# Patient Record
Sex: FEMALE | Race: WHITE | Employment: FULL TIME | URBAN - METROPOLITAN AREA
[De-identification: names, ages, dates, MRNs, and addresses within clinical notes are randomized per-mention and may not be internally consistent; named-entity substitution may affect disease eponyms.]

---

## 2019-06-13 ENCOUNTER — HOSPITAL ENCOUNTER (OUTPATIENT)
Dept: PHYSICAL THERAPY | Age: 34
Setting detail: THERAPIES SERIES
Discharge: HOME | End: 2019-06-13
Payer: COMMERCIAL

## 2019-06-13 PROCEDURE — 97530 THERAPEUTIC ACTIVITIES: CPT

## 2019-06-13 PROCEDURE — 97162 PT EVAL MOD COMPLEX 30 MIN: CPT

## 2019-06-13 PROCEDURE — 97110 THERAPEUTIC EXERCISES: CPT

## 2019-06-13 NOTE — PROGRESS NOTES
Home Exercise Program, Manual Therapy - Soft Tissue Mobilization    G-Code   FAAM= 10/84-12%     Goals  Short term goals  Time Frame for Short term goals: 1-2 wks  Short term goal 1: I with HEP  Long term goals  Time Frame for Long term goals : 3-4 wks  Long term goal 1: Improve ankle strength 5/5  Long term goal 2: Improve FAAM 5% or less  Long term goal 3: Perform ADLs including walking for exercise and return to yoga and long distance walking without rolling or spraining ankle  Patient Goals   Patient goals : \"not to fall\" and strengthen ankle to prevent progression of arthritis       Therapy Time   Individual Concurrent Group Co-treatment   Time In  1600         Time Out  1705         Minutes  [facility], [address] [facility], Jignesh Urbano

## 2019-06-19 ENCOUNTER — HOSPITAL ENCOUNTER (OUTPATIENT)
Dept: PHYSICAL THERAPY | Age: 34
Setting detail: THERAPIES SERIES
Discharge: HOME | End: 2019-06-19
Payer: COMMERCIAL

## 2019-06-19 PROCEDURE — 97530 THERAPEUTIC ACTIVITIES: CPT

## 2019-06-19 PROCEDURE — 97110 THERAPEUTIC EXERCISES: CPT

## 2019-06-19 PROCEDURE — 97140 MANUAL THERAPY 1/> REGIONS: CPT

## 2019-06-19 NOTE — PROGRESS NOTES
Physical Therapy  Daily Treatment Note  Date: 2019  Patient Name: Anjum Denise  MRN: [id number]     :   1985    Subjective:   General  Chart Reviewed: Yes  Family / Caregiver Present: No  Referring Practitioner: Dr. Narinder Simpson  PT Visit Information  Total # of Visits Approved: (1-3x per week for 3-4 weeks= 12 visits )  Total # of Visits to Date: 2  Plan of Care/Certification Expiration Date: 19  No Show: 0  Canceled Appointment: 0  Subjective  Subjective: Pt states her exercises are going well at home. Pain Screening  Patient Currently in Pain: No  Vital Signs  Patient Currently in Pain: No       Treatment Activities:   Manual therapy  Other: KT tape I strip anchored distal to lateral maleoli with superior pull 25% tension to provide support to lateral foot              Ambulation  Ambulation?: Yes  Ambulation 1  Surface: level tile;carpet  Device: No Device  Assistance: Independent  Quality of Gait: Near normal gait pattern with equal step and stride length, some sharp medial ankle pain upon initial stand which subsided after 5-10' ambulation  Distance: 120 feet  Comments: post KT tape application                   Exercises  Exercise 2: R ankle pf discontinued \"too easy\" (performed standing toe raises instead)  Exercise 3: GTB R ankle eversion 20 reps H 5  Exercise 6: heel/toe raises x 10 H5'' (easy)  Exercise 7: R SLS heel/toe raises; H3'' x 10   Exercise 8: blue disk RLE SLS; 10'', 10'' and 30'' with 0 UE assist        Manual therapy  Other: KT tape I strip anchored distal to lateral maleoli with superior pull 25% tension to provide support to lateral foot                          Assessment:   Conditions Requiring Skilled Therapeutic Intervention  Body structures, Functions, Activity limitations: Decreased functional mobility ; Decreased strength  Assessment: Pt continues to progress with strengthening tolerating inc reps of ther ex with green TB as well as addition of SLS on blue disk and single leg lary/toe raises with no c/o pain. Pt educated on benefits of KT tape and donned KT tape to support lateral aspect of R ankle. Pt having c/o sharp medial pain briefly upon standing and educated pt that her ankle is adjusting to strengthening recently and ok if dissipating. Educated pt on benefits of icing to prevent that sharp pain and post ther ex achiness. Pain level 0/10 post. COntinue to progress as melissa. Treatment Diagnosis: chronic ankle lateral instability  Patient Education: Educated pt on donning ice post LISA to prevent muscle fatigue and sharp pain.        G-Code:     OutComes Score                                                     Goals:  Short term goals  Time Frame for Short term goals: 1-2 wks  Short term goal 1: I with HEP  Long term goals  Time Frame for Long term goals : 3-4 wks  Long term goal 1: Improve ankle strength 5/5  Long term goal 2: Improve FAAM 5% or less  Long term goal 3: Perform ADLs including walking for exercise and return to yoga and long distance walking without rolling or spraining ankle  Patient Goals   Patient goals : \"not to fall\" and strengthen ankle to prevent progression of arthritis    Plan:      Plan  Times per week: 1-3  Plan weeks: 3-4  Current Treatment Recommendations: Strengthening, Balance Training, Functional Mobility Training, Neuromuscular Re-education, Home Exercise Program, Manual Therapy - Soft Tissue Mobilization        Therapy Time   Individual Concurrent Group Co-treatment   Time In  1600         Time Out  [address]         Minutes  [address] [employer] Thelma Sow PTA  License and [address] Number: [id number]

## 2019-06-21 ENCOUNTER — HOSPITAL ENCOUNTER (OUTPATIENT)
Dept: PHYSICAL THERAPY | Age: 34
Setting detail: THERAPIES SERIES
Discharge: HOME | End: 2019-06-21
Payer: COMMERCIAL

## 2019-06-21 PROCEDURE — 97110 THERAPEUTIC EXERCISES: CPT

## 2019-06-21 NOTE — PROGRESS NOTES
Physical Therapy  Daily Treatment Note  Date: 2019  Patient Name: Amaury Nichols  MRN: [id number]     :   1985    Subjective:   General  Chart Reviewed: Yes  Family / Caregiver Present: No  Referring Practitioner: Dr. Austin Ricketts  PT Visit Information  Total # of Visits Approved: (1-3x per week for 3-4 weeks= 12 visits )  Total # of Visits to Date: 3  Plan of Care/Certification Expiration Date: 19  No Show: 0  Canceled Appointment: 0  Subjective  Subjective: Pt. states she has no pain just alittle fatigue/soreness after she performs her HEP. Pt. states she has been icing. Pain Screening  Patient Currently in Pain: No  Vital Signs  Patient Currently in Pain: No       Treatment Activities:   Manual therapy  Other: KT tape intact               Ambulation  Ambulation?: Yes  Ambulation 1  Surface: level tile;carpet  Device: No Device  Assistance: Independent  Quality of Gait: Near normal gait pattern and good collette with KT tape donned. Distance: 440'  Stairs/Curb  Stairs?: No                Exercises  Exercise 1: GTB R ankle df 20 reps H5  Exercise 3: GTB R ankle eversion 20 reps H 5  Exercise 4: R SLS even surface eyes closed H 30sec x2  Exercise 6: heel toe raises 4\" box x10 hold 5 sec   Exercise 7: R SLS heel/toe raises; H3'' 2x 10   Exercise 8: blue disk RLE SLS; 30 sec x3 0 UE assist  Exercise 9: catching and throwing ball at rebounder R SLS x20 foward and  x10 ea lateral.   Exercise 10: standing R gastroc stretch hold 30 sec x2         Manual therapy  Other: KT tape intact                           Assessment:   Conditions Requiring Skilled Therapeutic Intervention  Body structures, Functions, Activity limitations: Decreased functional mobility ; Decreased strength  Assessment: Pt. tolerates increased strengthening this date without pain.   Pt. reports muscle fatigue post.  Edcuated gastroc stretch but focused on strengthening with addidtion of heel toe raises off 4\" box and R SLS catching and throwing small ball at rebounder. Applied CP post to assist with muscle recovery. Cont to inc to tolerance. Treatment Diagnosis: chronic ankle lateral instability  Patient Education: Pt. educated on keeping feet flat on floor when sitting and to avoid sitting cross legged.        G-Code:     OutComes Score                                                     Goals:  Short term goals  Time Frame for Short term goals: 1-2 wks  Short term goal 1: I with HEP  Long term goals  Time Frame for Long term goals : 3-4 wks  Long term goal 1: Improve ankle strength 5/5  Long term goal 2: Improve FAAM 5% or less  Long term goal 3: Perform ADLs including walking for exercise and return to yoga and long distance walking without rolling or spraining ankle  Patient Goals   Patient goals : \"not to fall\" and strengthen ankle to prevent progression of arthritis    Plan:      Plan  Times per week: 1-3  Plan weeks: 3-4  Current Treatment Recommendations: Strengthening, Balance Training, Functional Mobility Training, Neuromuscular Re-education, Home Exercise Program, Manual Therapy - Soft Tissue Mobilization            Therapy Time   Individual Concurrent Group Co-treatment   Time In  900         Time Out  950         Minutes  [address] [facility]  License and [address] Number: [id number]

## 2019-06-25 ENCOUNTER — HOSPITAL ENCOUNTER (OUTPATIENT)
Dept: PHYSICAL THERAPY | Age: 34
Setting detail: THERAPIES SERIES
Discharge: HOME | End: 2019-06-25
Payer: COMMERCIAL

## 2019-06-25 PROCEDURE — 97110 THERAPEUTIC EXERCISES: CPT

## 2019-06-25 NOTE — PROGRESS NOTES
Physical Therapy  Daily Treatment Note  Date: 2019  Patient Name: Radha Berrios  MRN: [id number]     :   1985    Treatment Diagnosis: chronic ankle lateral instability           Subjective:   General  Chart Reviewed: Yes  Family / Caregiver Present: No  Referring Practitioner: Dr. Liudmila Rizvi  PT Visit Information  Total # of Visits Approved: (1-3x per week for 3-4 weeks= 12 visits )  Total # of Visits to Date: 4  Plan of Care/Certification Expiration Date: 19  No Show: 0  Canceled Appointment: 0  Subjective  Subjective: Pt. states L ankle has not given out but feels \"I'm doing something\" post HEP. Pain Screening  Patient Currently in Pain: No(sore )  Vital Signs  Patient Currently in Pain: No(sore )       Treatment Activities:                  Ambulation  Ambulation?: No           AROM RLE (degrees)  RLE General AROM: R ankle DF 16 deg, 52 deg, INV 46 deg, EV 20 deg        Exercises  Exercise 1: GTB R ankle df 20-25 reps H5  Exercise 3: GTB R ankle eversion 20-25 reps H 5  Exercise 4: R SLS even surface eyes closed H 30sec x2  Exercise 7: R SLS heel/toe raises; H3'' 2x 10   Exercise 8: blue disk RLE SLS; 30 sec x2 0 UE assist  Exercise 9: catching and throwing ball at rebounder R SLS x20 foward and  x10 ea lateral.   Exercise 10: standing R gastroc stretch hold 30 sec x2                                    Assessment:   Conditions Requiring Skilled Therapeutic Intervention  Body structures, Functions, Activity limitations: Decreased functional mobility ; Decreased strength  Assessment: Pt. challenged with current program.  Pt. improving with DF rom inc 2 deg and less laxity for INV and PF. No pain post session but muscle fatigue noted. Pt. to ice at home. Cont to tolerance.    Treatment Diagnosis: chronic ankle lateral instability      G-Code:     OutComes Score                                                     Goals:  Short term goals  Time Frame for Short term goals: 1-2 wks  Short term goal 1: I with HEP  Long term goals  Time Frame for Long term goals : 3-4 wks  Long term goal 1: Improve ankle strength 5/5  Long term goal 2: Improve FAAM 5% or less  Long term goal 3: Perform ADLs including walking for exercise and return to yoga and long distance walking without rolling or spraining ankle  Patient Goals   Patient goals : \"not to fall\" and strengthen ankle to prevent progression of arthritis    Plan:      Plan  Times per week: 1-3  Plan weeks: 3-4  Current Treatment Recommendations: Strengthening, Balance Training, Functional Mobility Training, Neuromuscular Re-education, Home Exercise Program, Manual Therapy - Soft Tissue Mobilization            Therapy Time   Individual Concurrent Group Co-treatment   Time In  1600         Time Out  1645         Minutes  [address], PTA  License and [address] Number: [id number]

## 2019-06-27 ENCOUNTER — HOSPITAL ENCOUNTER (OUTPATIENT)
Dept: PHYSICAL THERAPY | Age: 34
Setting detail: THERAPIES SERIES
Discharge: HOME | End: 2019-06-27
Payer: COMMERCIAL

## 2019-06-27 PROCEDURE — 97116 GAIT TRAINING THERAPY: CPT

## 2019-06-27 PROCEDURE — 97530 THERAPEUTIC ACTIVITIES: CPT

## 2019-06-27 PROCEDURE — 97110 THERAPEUTIC EXERCISES: CPT

## 2019-06-27 NOTE — PROGRESS NOTES
Physical Therapy  Daily Treatment Note  Date: 2019  Patient Name: Alicia Moreno  MRN: [id number]     :   1985  Treatment Diagnosis: chronic ankle lateral instability          Subjective:   General  Chart Reviewed: Yes  Family / Caregiver Present: No  Referring Practitioner: Dr. Judah Strickland  PT Visit Information  Total # of Visits Approved: (1-3x per week for 3-4 weeks= 12 visits )  Total # of Visits to Date: 5  Plan of Care/Certification Expiration Date: 19  No Show: 0  Canceled Appointment: 0  Subjective  Subjective: Pt. rpeorts gastroc soreness from performing HEP and feels as if exercises are working. Pain Screening  Patient Currently in Pain: No  Vital Signs  Patient Currently in Pain: No       Treatment Activities:                  Ambulation  Ambulation?: Yes  Ambulation 1  Surface: carpet;outdoors;ramp;uneven;level tile(hill )  Device: No Device  Assistance: Independent  Quality of Gait: normal gait pattern with good collette no sign of ankle instability with walking on both even and uneven surfaces including going up and down hill   Distance: >2000'                    Exercises  Exercise 11: walking lunges 1 lap   Exercise 12: carioca x2 slow pace and x2 quick                                    Assessment:   Conditions Requiring Skilled Therapeutic Intervention  Body structures, Functions, Activity limitations: Decreased functional mobility ; Decreased strength  Assessment: Challenged patient with ambualtion outdoors on uneven surface and walking up and down hill with no rolling of ankle. Pt. also challenged [facility] highter level balance performing cariocia in ramirez to display quick movements as sometimes pt. does [place]. Pt. with no c/o pain or ankle instability throughout session. Recommended patient to try longer walks or return to yoga within the next couple weeks and monitor if ankle feels stable or unstable.     Treatment Diagnosis: chronic ankle lateral instability  Patient Education: Issued pt. and reviewed education for if rolls ankle to use PRICE to heal as well ask seek medical attention if necessary.         G-Code:     OutComes Score                                                     Goals:  Short term goals  Time Frame for Short term goals: 1-2 wks  Short term goal 1: I with HEP  Long term goals  Time Frame for Long term goals : 3-4 wks  Long term goal 1: Improve ankle strength 5/5  Long term goal 2: Improve FAAM 5% or less  Long term goal 3: Perform ADLs including walking for exercise and return to yoga and long distance walking without rolling or spraining ankle  Patient Goals   Patient goals : \"not to fall\" and strengthen ankle to prevent progression of arthritis    Plan:      Times per week: 1-3  Plan weeks: 3-4  Current Treatment Recommendations: Strengthening, Balance Training, Functional Mobility Training, Neuromuscular Re-education, Home Exercise Program, Manual Therapy - Soft Tissue Mobilization               Therapy Time   Individual Concurrent Group Co-treatment   Time In  1600         Time Out  1640         Minutes  [address] [facility]  License and [address] Number: [id number]

## 2019-07-03 ENCOUNTER — HOSPITAL ENCOUNTER (OUTPATIENT)
Dept: PHYSICAL THERAPY | Age: 34
Setting detail: THERAPIES SERIES
Discharge: HOME | End: 2019-07-03
Payer: COMMERCIAL

## 2019-07-03 PROCEDURE — 97110 THERAPEUTIC EXERCISES: CPT

## 2019-07-10 ENCOUNTER — HOSPITAL ENCOUNTER (OUTPATIENT)
Dept: PHYSICAL THERAPY | Age: 34
Setting detail: THERAPIES SERIES
Discharge: HOME | End: 2019-07-10
Payer: COMMERCIAL

## 2019-07-10 PROCEDURE — 97530 THERAPEUTIC ACTIVITIES: CPT

## 2019-07-10 NOTE — PROGRESS NOTES
Physical Therapy  Discharge Note  Date: 7/10/2019  Patient Name: Catarino Martinze  MRN: [id number]     :   1985    Subjective:   General  Chart Reviewed: Yes  Family / Caregiver Present: No  Referring Practitioner: Dr. New Ramey  PT Visit Information  Total # of Visits Approved: (7 current 1-2x week for 4-6 weeks=19 visits )  Total # of Visits to Date: 8  Plan of Care/Certification Expiration Date: 19  No Show: 0  Canceled Appointment: 0  Subjective  Subjective: Pt reports that her ankle is sore but not painful. General Comment  Comments: DC today due to pt meeting majority of her goals and pt ready to continue on her own. Pain Screening  Patient Currently in Pain: No  Vital Signs  Patient Currently in Pain: No       Treatment Activities:          Strength RLE  R Ankle Dorsiflexion: 4+/5  R Ankle Plantar flexion: 4+/5  R Ankle Inversion: 4+/5  R Ankle Eversion: 4+/5     Assessment:   Conditions Requiring Skilled Therapeutic Intervention  Body structures, Functions, Activity limitations: Decreased functional mobility ; Decreased strength  Assessment: Completed DC today due to pt able to perform her exercises independently and pt reports that her right ankle is feeling stronger and more stable. PT discussed at length how to progress with her exercises and  gave pt BTB. Also gave pt info regarding our Wellness Program. Pt agreeable to DC today.    Treatment Diagnosis: chronic ankle lateral instability  Prognosis: Good  Patient Education: Gave BTB for HEP   REQUIRES PT FOLLOW UP: No      G-Code:     OutComes Score                                                     Goals:  Short term goals  Time Frame for Short term goals: 1-2 wks  Short term goal 1: I with HEP(Pt reports able to do current program at home )  Long term goals  Time Frame for Long term goals : 3-4 wks  Long term goal 1: Improve ankle strength 5/5(Grossly 4+/5 R ankle - partially met)  Long term goal 2: Improve FAAM 5% or less(FAAM at DC=2% -Goal met)  Long term goal 3: Perform ADLs including walking for exercise and return to yoga and long distance walking without rolling or spraining ankle(Prolonged walks reports feeling good and R ankle strong -Met)  Patient Goals   Patient goals : \"not to fall\" and strengthen ankle to prevent progression of arthritis    Plan:  DC to HEP and Wellness Program           Therapy Time   Individual Concurrent Group Co-treatment   Time In  2:00pm         Time Out  3:00pm         Minutes  60 min                 CHEN Garcia#[phone]

## 2021-02-02 ENCOUNTER — OFFICE VISIT (OUTPATIENT)
Dept: FAMILY MEDICINE CLINIC | Age: 36
End: 2021-02-02
Payer: COMMERCIAL

## 2021-02-02 VITALS
OXYGEN SATURATION: 99 % | HEIGHT: 63 IN | BODY MASS INDEX: 29.06 KG/M2 | SYSTOLIC BLOOD PRESSURE: 120 MMHG | HEART RATE: 100 BPM | DIASTOLIC BLOOD PRESSURE: 80 MMHG | TEMPERATURE: 98 F | WEIGHT: 164 LBS

## 2021-02-02 DIAGNOSIS — Z00.00 WELL ADULT EXAM: Primary | ICD-10-CM

## 2021-02-02 DIAGNOSIS — K21.9 GASTROESOPHAGEAL REFLUX DISEASE WITHOUT ESOPHAGITIS: ICD-10-CM

## 2021-02-02 DIAGNOSIS — Z13.220 LIPID SCREENING: ICD-10-CM

## 2021-02-02 DIAGNOSIS — Z80.0 FAMILY HISTORY OF ESOPHAGEAL CANCER: ICD-10-CM

## 2021-02-02 PROCEDURE — 99395 PREV VISIT EST AGE 18-39: CPT | Performed by: NURSE PRACTITIONER

## 2021-02-02 PROCEDURE — G8484 FLU IMMUNIZE NO ADMIN: HCPCS | Performed by: NURSE PRACTITIONER

## 2021-02-02 RX ORDER — NORETHINDRONE ACETATE AND ETHINYL ESTRADIOL 1; .02 MG/1; MG/1
TABLET ORAL
COMMUNITY
Start: 2021-01-09

## 2021-02-02 ASSESSMENT — ENCOUNTER SYMPTOMS
ABDOMINAL PAIN: 1
COUGH: 0
SHORTNESS OF BREATH: 0
ABDOMINAL DISTENTION: 0

## 2021-02-02 ASSESSMENT — PATIENT HEALTH QUESTIONNAIRE - PHQ9
SUM OF ALL RESPONSES TO PHQ QUESTIONS 1-9: 0
2. FEELING DOWN, DEPRESSED OR HOPELESS: 0
SUM OF ALL RESPONSES TO PHQ QUESTIONS 1-9: 0

## 2021-02-02 NOTE — PROGRESS NOTES
Subjective  Chief Complaint   Patient presents with    Annual Exam    Heartburn     pt states that she has a history of gastro cancer in her family, has been having heartburn bad lately would like to discuss this. HPI     Check up. Heartburn. Has become more persistent. Takes TUMS for it which doesn't help. Just bought prilosec. Does realize diet is likely related. Very strong hx of esophageal cancer. Youngest in 30's. Recently switched from coffee to tea. Has noticed that she has gained some weight since pandemic. Has been less active. There are no active problems to display for this patient. No past medical history on file. Past Surgical History:   Procedure Laterality Date    WISDOM TOOTH EXTRACTION  1-2013    all 4      No family history on file.   Social History     Socioeconomic History    Marital status:      Spouse name: None    Number of children: None    Years of education: None    Highest education level: None   Occupational History    None   Social Needs    Financial resource strain: Not hard at all   [employer] insecurity     Worry: Never true     Inability: Never true    Transportation needs     Medical: No     Non-medical: No   Tobacco Use    Smoking status: Never Smoker    Smokeless tobacco: Never Used   Substance and Sexual Activity    Alcohol use: None    Drug use: None    Sexual activity: None   Lifestyle    Physical activity     Days per week: None     Minutes per session: None    Stress: None   Relationships    Social connections     Talks on phone: None     Gets together: None     Attends [employer] service: None     Active member of club or organization: None     Attends meetings of clubs or organizations: None     Relationship status: None    Intimate partner violence     Fear of current or ex partner: None     Emotionally abused: None     Physically abused: None     Forced sexual activity: None   Other Topics Concern    None Social History Narrative    None     Current Outpatient Medications on File Prior to Visit   Medication Sig Dispense Refill    norethindrone-ethinyl estradiol (MICROGESTIN 1/20) 1-20 MG-MCG per tablet TAKE ONE TABLET BY MOUTH DAILY skip placebo      ibuprofen (ADVIL;MOTRIN) 200 MG tablet Take 200 mg by mouth every 6 hours as needed for Pain      fluticasone (FLONASE) 50 MCG/ACT nasal spray 1 spray by Nasal route daily (Patient not taking: Reported on 2/2/2021) 1 Bottle 3     No current facility-administered medications on file prior to visit. Allergies   Allergen Reactions    Codeine        Review of Systems   Constitutional: Negative for fatigue. Respiratory: Negative for cough and shortness of breath. Cardiovascular: Negative for chest pain. Gastrointestinal: Positive for abdominal pain. Negative for abdominal distention. Objective  Vitals:    02/02/21 0929 02/02/21 0940 02/02/21 0955   BP: (!) 142/88 (!) 144/90 120/80   Site: Left Upper Arm     Position: Sitting     Cuff Size: Medium Adult     Pulse: 100     Temp: 98 °F (36.7 °C)     SpO2: 99%     Weight: 164 lb (74.4 kg)     Height: 5' 3\" (1.6 m)       Physical Exam  Vitals signs and nursing note reviewed. Constitutional:       Appearance: Normal appearance. She is normal weight. HENT:      Head: Normocephalic. Mouth/Throat:      Mouth: Mucous membranes are moist.   Cardiovascular:      Rate and Rhythm: Normal rate and regular rhythm. Pulses: Normal pulses. Heart sounds: Normal heart sounds. Pulmonary:      Effort: Pulmonary effort is normal.      Breath sounds: Normal breath sounds. Skin:     General: Skin is warm. Neurological:      General: No focal deficit present. Mental Status: She is alert and oriented to person, place, and time. Mental status is at baseline. Psychiatric:         Mood and Affect: Mood normal.         Behavior: Behavior normal.         Thought Content:  Thought content normal. Judgment: Judgment normal.       Assessment & Plan     Diagnosis Orders   1. Well adult exam     2. Lipid screening  Lipid Panel   3. Gastroesophageal reflux disease without esophagitis  Comprehensive Metabolic Panel    CBC Auto Differential    Ambulatory referral to Gastroenterology   4. Family history of esophageal cancer  Ambulatory referral to Gastroenterology       Orders Placed This Encounter   Procedures    Lipid Panel     Standing Status:   Future     Standing Expiration Date:   2/2/2022     Order Specific Question:   Is Patient Fasting?/# of Hours     Answer:   12    Comprehensive Metabolic Panel     Standing Status:   Future     Standing Expiration Date:   2/2/2022    CBC Auto Differential     Standing Status:   Future     Standing Expiration Date:   2/2/2022    Ambulatory referral to Gastroenterology     Referral Priority:   Routine     Referral Type:   Eval and Treat     Referral Reason:   Specialty Services Required     Number of Visits Requested:   1       No orders of the defined types were placed in this encounter. Side effects, adverse effects of the medication prescribed today, as well as treatment plan/ rationale and result expectations have been discussed with the patient who expresses understanding and desires to proceed. Close follow up to evaluate treatment results and for coordination of care. I have reviewed the patient's medical history in detail and updated the computerized patient record. As always, patient is advised that if symptoms worsen in any way they will proceed to the nearest emergency room. Michele estradan.     JULIA Manzanares - CNP

## 2021-02-23 ENCOUNTER — OFFICE VISIT (OUTPATIENT)
Dept: GASTROENTEROLOGY | Age: 36
End: 2021-02-23
Payer: COMMERCIAL

## 2021-02-23 VITALS
SYSTOLIC BLOOD PRESSURE: 117 MMHG | RESPIRATION RATE: 14 BRPM | HEART RATE: 102 BPM | WEIGHT: 162.4 LBS | DIASTOLIC BLOOD PRESSURE: 72 MMHG | OXYGEN SATURATION: 99 % | BODY MASS INDEX: 28.77 KG/M2 | TEMPERATURE: 97 F | HEIGHT: 63 IN

## 2021-02-23 DIAGNOSIS — K21.9 GASTROESOPHAGEAL REFLUX DISEASE WITHOUT ESOPHAGITIS: Primary | ICD-10-CM

## 2021-02-23 DIAGNOSIS — Z01.818 PRE-OP TESTING: Primary | ICD-10-CM

## 2021-02-23 PROCEDURE — G8427 DOCREV CUR MEDS BY ELIG CLIN: HCPCS | Performed by: INTERNAL MEDICINE

## 2021-02-23 PROCEDURE — 1036F TOBACCO NON-USER: CPT | Performed by: INTERNAL MEDICINE

## 2021-02-23 PROCEDURE — 99203 OFFICE O/P NEW LOW 30 MIN: CPT | Performed by: INTERNAL MEDICINE

## 2021-02-23 PROCEDURE — G8419 CALC BMI OUT NRM PARAM NOF/U: HCPCS | Performed by: INTERNAL MEDICINE

## 2021-02-23 PROCEDURE — G8484 FLU IMMUNIZE NO ADMIN: HCPCS | Performed by: INTERNAL MEDICINE

## 2021-02-23 RX ORDER — OMEPRAZOLE 20 MG/1
20 CAPSULE, DELAYED RELEASE ORAL DAILY
COMMUNITY

## 2021-02-23 ASSESSMENT — ENCOUNTER SYMPTOMS
VOICE CHANGE: 0
DIARRHEA: 0
NAUSEA: 0
BLOOD IN STOOL: 0
EYE PAIN: 0
VOMITING: 0
PHOTOPHOBIA: 0
ABDOMINAL PAIN: 0
EYE REDNESS: 0
TROUBLE SWALLOWING: 0
SHORTNESS OF BREATH: 0
RECTAL PAIN: 0
COLOR CHANGE: 0
CONSTIPATION: 0
WHEEZING: 0
CHEST TIGHTNESS: 0
ABDOMINAL DISTENTION: 0

## 2021-02-23 NOTE — PROGRESS NOTES
Subjective:      Patient ID: Ellie Quintana is a 39 y.o. female who presents today for:  Chief Complaint   Patient presents with    Gastroesophageal Reflux     family HX of esophageal cancer        HPI  This is a very pleasant 66-year-old who came in today for further evaluation management of GERD and acid. Patient mentioned that over the last few months and and more recently, she noticed burning sensation and rarely food regurgitation that occurs especially after meals. Patient mentioned that she been prescribed Prilosec with partial symptoms improvement. Denies any difficulty swallowing or weight loss. As mentioned rare nocturnal symptoms. No previous EGD reported. Does endorse family history of esophageal cancer. No dysphagia, hematemesis or melena reported. Patient came in today to establish care and further evaluation management  No past medical history on file.   Past Surgical History:   Procedure Laterality Date    WISDOM TOOTH EXTRACTION  1-2013    all 4      Social History     Socioeconomic History    Marital status:      Spouse name: Not on file    Number of children: Not on file    Years of education: Not on file    Highest education level: Not on file   Occupational History    Not on file   Social Needs    Financial resource strain: Not hard at all    Food insecurity     Worry: Never true     Inability: Never true   [employer] needs     Medical: No     Non-medical: No   Tobacco Use    Smoking status: Never Smoker    Smokeless tobacco: Never Used   Substance and Sexual Activity    Alcohol use: Not on file    Drug use: Not on file    Sexual activity: Not on file   Lifestyle    Physical activity     Days per week: Not on file     Minutes per session: Not on file    Stress: Not on file   Relationships    Social connections     Talks on phone: Not on file     Gets together: Not on file     Attends Yazdanism service: Not on file Rate and Rhythm: Normal rate and regular rhythm. Heart sounds: Normal heart sounds. Pulmonary:      Effort: Pulmonary effort is normal. No respiratory distress. Breath sounds: Normal breath sounds. No wheezing or rales. Abdominal:      General: Bowel sounds are normal. There is no distension. Palpations: Abdomen is soft. Abdomen is not rigid. There is no hepatomegaly, splenomegaly or mass. Tenderness: There is no abdominal tenderness. There is no guarding or rebound. Musculoskeletal: Normal range of motion. General: No tenderness or deformity. Skin:     Coloration: Skin is not pale. Findings: No erythema or rash. Neurological:      Mental Status: She is alert and oriented to person, place, and time. Laboratory, Pathology, Radiology reviewed in detail with relevantimportant investigations summarized below:  No results found for: WBC, HGB, HCT, MCV, PLT . No results found for: ALT, AST, GGT, ALKPHOS, BILITOT    No results found. No results found for: IRON, TIBC, FERRITIN  No results found for: INR  No components found for: ACUTEHEPATITISSCREEN  No components found for: CELIACPANEL  No components found for: STOOLCULTURE, C.DIFF, STOOLOVAPARASITE, STOOLLEUCOCYTE        Assessment:       Diagnosis Orders   1. Gastroesophageal reflux disease without esophagitis  EGD         Plan:      Orders Placed This Encounter   Procedures    EGD     Standing Status:   Future     Standing Expiration Date:   6/23/2021     Order Specific Question:   Screening or Diagnostic?      Answer:   Diagnostic     1- Heartburn / GERD  We will continue Prilosec 20 to 30 minutes before breakfast   Pathophysiology and etiology of reflux discussed at length   Lifestyle modification recommended and discussed with the patient   --Avoid spicy and acidic based foods   --Limit coffee, tea, alcohol use   --Limit the amount of food during meal time --Avoid bedtime snacks and eat meals 3 to 4 hrs before lying down   --Elevate the head of the bed    --Keep healthy weight  EGD requested to assess for heartburn/GERD related complication. Assess the presence of hiatal hernia. The upper endoscopy procedure, complications, risk and benefit were reviewed. Patient would like to proceed accordingly. 2- No significant associated medical conditions, reported family history of esophageal cancer    Return in about 6 weeks (around 4/6/2021) for Post procedure results discussion, further management.       Alejandro Ruff MD

## 2021-02-24 ENCOUNTER — NURSE ONLY (OUTPATIENT)
Dept: PRIMARY CARE CLINIC | Age: 36
End: 2021-02-24

## 2021-02-24 DIAGNOSIS — Z01.818 PRE-OP TESTING: ICD-10-CM

## 2021-02-25 LAB
SARS-COV-2: NOT DETECTED
SOURCE: NORMAL

## 2021-03-01 ENCOUNTER — ANESTHESIA EVENT (OUTPATIENT)
Dept: ENDOSCOPY | Age: 36
End: 2021-03-01
Payer: COMMERCIAL

## 2021-03-01 NOTE — ANESTHESIA PRE PROCEDURE
 Smoking status: Never Smoker    Smokeless tobacco: Never Used   Substance Use Topics    Alcohol use: Not on file                                Counseling given: Not Answered      Vital Signs (Current): There were no vitals filed for this visit. BP Readings from Last 3 Encounters:   02/23/21 117/72   02/02/21 120/80   02/15/16 124/60       NPO Status:                                                                                 BMI:   Wt Readings from Last 3 Encounters:   02/23/21 162 lb 6.4 oz (73.7 kg)   02/02/21 164 lb (74.4 kg)   02/15/16 153 lb (69.4 kg)     There is no height or weight on file to calculate BMI.    CBC: No results found for: WBC, RBC, HGB, HCT, MCV, RDW, PLT    CMP: No results found for: NA, K, CL, CO2, BUN, CREATININE, GFRAA, AGRATIO, LABGLOM, GLUCOSE, PROT, CALCIUM, BILITOT, ALKPHOS, AST, ALT    POC Tests: No results for input(s): POCGLU, POCNA, POCK, POCCL, POCBUN, POCHEMO, POCHCT in the last 72 hours.     Coags: No results found for: PROTIME, INR, APTT    HCG (If Applicable): No results found for: PREGTESTUR, PREGSERUM, HCG, HCGQUANT     ABGs: No results found for: PHART, PO2ART, FZN8PCK, LHH7RJS, BEART, R2CCMXKR     Type & Screen (If Applicable):  No results found for: LABABO, LABRH    Drug/Infectious Status (If Applicable):  No results found for: HIV, HEPCAB    COVID-19 Screening (If Applicable):   Lab Results   Component Value Date    COVID19 Not Detected 02/24/2021         Anesthesia Evaluation    Airway: Mallampati: II  TM distance: >3 FB   Neck ROM: full  Mouth opening: > = 3 FB Dental:          Pulmonary:Negative Pulmonary ROS and normal exam                               Cardiovascular:Negative CV ROS            Rhythm: regular  Rate: normal                    Neuro/Psych:   Negative Neuro/Psych ROS              GI/Hepatic/Renal:   (+) GERD:,           Endo/Other: Negative Endo/Other ROS                    Abdominal: Vascular: negative vascular ROS. Anesthesia Plan      MAC     ASA 1       Induction: intravenous. Anesthetic plan and risks discussed with patient. Plan discussed with attending.                   JULIA Almeida - CRNA   3/1/2021

## 2021-03-02 ENCOUNTER — ANESTHESIA (OUTPATIENT)
Dept: ENDOSCOPY | Age: 36
End: 2021-03-02
Payer: COMMERCIAL

## 2021-03-02 ENCOUNTER — ANCILLARY PROCEDURE (OUTPATIENT)
Dept: ENDOSCOPY | Age: 36
End: 2021-03-02
Attending: INTERNAL MEDICINE
Payer: COMMERCIAL

## 2021-03-02 ENCOUNTER — HOSPITAL ENCOUNTER (OUTPATIENT)
Age: 36
Setting detail: OUTPATIENT SURGERY
Discharge: HOME | End: 2021-03-02
Attending: INTERNAL MEDICINE | Admitting: INTERNAL MEDICINE
Payer: COMMERCIAL

## 2021-03-02 VITALS
OXYGEN SATURATION: 98 % | SYSTOLIC BLOOD PRESSURE: 129 MMHG | RESPIRATION RATE: 16 BRPM | DIASTOLIC BLOOD PRESSURE: 85 MMHG

## 2021-03-02 VITALS
BODY MASS INDEX: 27.82 KG/M2 | DIASTOLIC BLOOD PRESSURE: 68 MMHG | RESPIRATION RATE: 16 BRPM | HEART RATE: 74 BPM | TEMPERATURE: 97.7 F | HEIGHT: 63 IN | OXYGEN SATURATION: 97 % | WEIGHT: 157 LBS | SYSTOLIC BLOOD PRESSURE: 121 MMHG

## 2021-03-02 PROCEDURE — 6360000002 HC RX W HCPCS: Performed by: NURSE ANESTHETIST, CERTIFIED REGISTERED

## 2021-03-02 PROCEDURE — 2580000003 HC RX 258: Performed by: INTERNAL MEDICINE

## 2021-03-02 PROCEDURE — 43235 EGD DIAGNOSTIC BRUSH WASH: CPT | Performed by: INTERNAL MEDICINE

## 2021-03-02 PROCEDURE — 3700000000 HC ANESTHESIA ATTENDED CARE: Performed by: INTERNAL MEDICINE

## 2021-03-02 PROCEDURE — 7100000010 HC PHASE II RECOVERY - FIRST 15 MIN: Performed by: INTERNAL MEDICINE

## 2021-03-02 PROCEDURE — 2500000003 HC RX 250 WO HCPCS: Performed by: NURSE ANESTHETIST, CERTIFIED REGISTERED

## 2021-03-02 PROCEDURE — 2709999900 HC NON-CHARGEABLE SUPPLY: Performed by: INTERNAL MEDICINE

## 2021-03-02 PROCEDURE — 6370000000 HC RX 637 (ALT 250 FOR IP): Performed by: INTERNAL MEDICINE

## 2021-03-02 PROCEDURE — 7100000011 HC PHASE II RECOVERY - ADDTL 15 MIN: Performed by: INTERNAL MEDICINE

## 2021-03-02 PROCEDURE — 3609017100 HC EGD: Performed by: INTERNAL MEDICINE

## 2021-03-02 RX ORDER — LIDOCAINE HYDROCHLORIDE 20 MG/ML
INJECTION, SOLUTION INFILTRATION; PERINEURAL PRN
Status: DISCONTINUED | OUTPATIENT
Start: 2021-03-02 | End: 2021-03-02 | Stop reason: SDUPTHER

## 2021-03-02 RX ORDER — MAGNESIUM HYDROXIDE 1200 MG/15ML
LIQUID ORAL PRN
Status: DISCONTINUED | OUTPATIENT
Start: 2021-03-02 | End: 2021-03-02 | Stop reason: ALTCHOICE

## 2021-03-02 RX ORDER — 0.9 % SODIUM CHLORIDE 0.9 %
500 INTRAVENOUS SOLUTION INTRAVENOUS ONCE
Status: COMPLETED | OUTPATIENT
Start: 2021-03-02 | End: 2021-03-02

## 2021-03-02 RX ORDER — SODIUM CHLORIDE 9 MG/ML
INJECTION, SOLUTION INTRAVENOUS
Status: DISCONTINUED
Start: 2021-03-02 | End: 2021-03-02 | Stop reason: HOSPADM

## 2021-03-02 RX ORDER — PROPOFOL 10 MG/ML
INJECTION, EMULSION INTRAVENOUS PRN
Status: DISCONTINUED | OUTPATIENT
Start: 2021-03-02 | End: 2021-03-02 | Stop reason: SDUPTHER

## 2021-03-02 RX ORDER — SIMETHICONE 20 MG/.3ML
EMULSION ORAL PRN
Status: DISCONTINUED | OUTPATIENT
Start: 2021-03-02 | End: 2021-03-02 | Stop reason: ALTCHOICE

## 2021-03-02 RX ADMIN — LIDOCAINE HYDROCHLORIDE 60 MG: 20 INJECTION, SOLUTION INFILTRATION; PERINEURAL at 13:08

## 2021-03-02 RX ADMIN — PROPOFOL 250 MG: 10 INJECTION, EMULSION INTRAVENOUS at 13:08

## 2021-03-02 RX ADMIN — SODIUM CHLORIDE 500 ML: 9 INJECTION, SOLUTION INTRAVENOUS at 12:15

## 2021-03-02 ASSESSMENT — PULMONARY FUNCTION TESTS
PIF_VALUE: 1

## 2021-03-02 NOTE — H&P
Patient Name: Juan Luis Mcclain  : [id number]  MRN: [id number]  DATE: 21      ENDOSCOPY  History and Physical    Procedure:    [] Diagnostic Colonoscopy       [] Screening Colonoscopy  [x] EGD      [] ERCP      [] EUS       [] Other    [x] Previous office notes/History and Physical reviewed from the patients chart. Please see EMR for further details of HPI. I have examined the patient's status immediately prior to the procedure and:      Indications/HPI:    []Abdominal Pain   []Cancer- GI/Lung  []Fhx of colon CA/polyps  []History of Polyps   []Milligans   []Melena  []Abnormal Imaging   []Dysphagia    []Persistent Pneumonia  []Anemia   []Food Impaction  []History of Polyps  []GI Bleed   []Pulmonary nodule/Mass  []Change in bowel habits  [x]Heartburn/Reflux  []Rectal Bleed (BRBPR)  []Chest Pain - Non Cardiac  []Heme (+) Stool  []Ulcers  []Constipation   []Hemoptysis   []Varices  []Diarrhea   []Hypoxemia  []Nausea/Vomiting   []Screening   []Crohns/Colitis  []Other:    Anesthesia:   [x] MAC [] Moderate Sedation   [] General   [] None     ROS: 12 pt Review of Symptoms was negative unless mentioned above    Medications:   Prior to Admission medications    Medication Sig Start Date End Date Taking? Authorizing Provider   omeprazole (PRILOSEC) 20 MG delayed release capsule Take 20 mg by mouth daily   Yes Historical Provider, MD   norethindrone-ethinyl estradiol (MICROGESTIN ) 1-20 MG-MCG per tablet TAKE ONE TABLET BY MOUTH DAILY skip placebo 21  Yes Historical Provider, MD   ibuprofen (ADVIL;MOTRIN) 200 MG tablet Take 200 mg by mouth every 6 hours as needed for Pain   Yes Historical Provider, MD   fluticasone (FLONASE) 50 MCG/ACT nasal spray 1 spray by Nasal route daily  Patient not taking: Reported on 2021 2/15/16   JULIA Hansen - CNP       Allergies:    Allergies   Allergen Reactions    Codeine         History of allergic reaction to anesthesia:  No    Past Medical History:  No past medical history on file. Past Surgical History:  Past Surgical History:   Procedure Laterality Date    WISDOM TOOTH EXTRACTION  1-2013    all 4        Social History:  Social History     Tobacco Use    Smoking status: Never Smoker    Smokeless tobacco: Never Used   Substance Use Topics    Alcohol use: Not on file    Drug use: Not on file       Vital Signs:   Vitals:    03/02/21 1152   BP: 131/74   Pulse: 85   Resp: 18   Temp: 97.7 °F (36.5 °C)   SpO2: 99%        Physical Exam:  Cardiac:  [x]WNL  []Comments:  Pulmonary:  [x]WNL   []Comments:   Neuro/Mental Status:  [x]WNL  []Comments:  Abdominal:  [x]WNL    []Comments:  Other:   []WNL  []Comments:    Informed Consent:  The risks and benefits of the procedure have been discussed with either the patient or if they cannot consent, their representative. Assessment:  Patient examined and appropriate for planned sedation and procedure. Plan:  Proceed with planned sedation and procedure as above.     Radha Lemus MD  12:06 PM

## 2021-03-02 NOTE — ANESTHESIA POSTPROCEDURE EVALUATION
Department of Anesthesiology  Postprocedure Note    Patient: Gayla Guan  MRN: [id number]  YOB: 1985  Date of evaluation: 3/2/2021  Time:  1:15 PM     Procedure Summary     Date: 03/02/21 Room / Location: [address] [place] [address]    Anesthesia Start: 0572 Anesthesia Stop:     Procedure: EGD ESOPHAGOGASTRODUODENOSCOPY (N/A ) Diagnosis: (GERD  K21.9)    Surgeons: Lb Tyler MD Responsible Provider: JULIA Nick CRNA    Anesthesia Type: MAC ASA Status: 1          Anesthesia Type: No value filed. Cleveland Phase I: Cleveland Score: 10    Cleveland Phase II:      Last vitals: Reviewed and per EMR flowsheets.        Anesthesia Post Evaluation    Patient location during evaluation: bedside  Patient participation: complete - patient participated  Level of consciousness: awake and awake and alert  Pain score: 0  Airway patency: patent  Nausea & Vomiting: no nausea and no vomiting  Complications: no  Cardiovascular status: blood pressure returned to baseline and hemodynamically stable  Respiratory status: acceptable and spontaneous ventilation  Hydration status: euvolemic

## 2021-03-08 DIAGNOSIS — Z13.220 LIPID SCREENING: ICD-10-CM

## 2021-03-08 DIAGNOSIS — K21.9 GASTROESOPHAGEAL REFLUX DISEASE WITHOUT ESOPHAGITIS: ICD-10-CM

## 2021-03-08 LAB
ALBUMIN SERPL-MCNC: 4.3 G/DL (ref 3.5–4.6)
ALP BLD-CCNC: 46 U/L (ref 40–130)
ALT SERPL-CCNC: 10 U/L (ref 0–33)
ANION GAP SERPL CALCULATED.3IONS-SCNC: 13 MEQ/L (ref 9–15)
AST SERPL-CCNC: 13 U/L (ref 0–35)
BASOPHILS ABSOLUTE: 0 K/UL (ref 0–0.2)
BASOPHILS RELATIVE PERCENT: 0.8 %
BILIRUB SERPL-MCNC: 0.3 MG/DL (ref 0.2–0.7)
BUN BLDV-MCNC: 11 MG/DL (ref 6–20)
CALCIUM SERPL-MCNC: 9.2 MG/DL (ref 8.5–9.9)
CHLORIDE BLD-SCNC: 102 MEQ/L (ref 95–107)
CHOLESTEROL, TOTAL: 208 MG/DL (ref 0–199)
CO2: 22 MEQ/L (ref 20–31)
CREAT SERPL-MCNC: 0.67 MG/DL (ref 0.5–0.9)
EOSINOPHILS ABSOLUTE: 0.1 K/UL (ref 0–0.7)
EOSINOPHILS RELATIVE PERCENT: 2.7 %
GFR AFRICAN AMERICAN: >60
GFR NON-AFRICAN AMERICAN: >60
GLOBULIN: 2.9 G/DL (ref 2.3–3.5)
GLUCOSE BLD-MCNC: 84 MG/DL (ref 70–99)
HCT VFR BLD CALC: 40.3 % (ref 37–47)
HDLC SERPL-MCNC: 55 MG/DL (ref 40–59)
HEMOGLOBIN: 13.4 G/DL (ref 12–16)
LDL CHOLESTEROL CALCULATED: 122 MG/DL (ref 0–129)
LYMPHOCYTES ABSOLUTE: 1.4 K/UL (ref 1–4.8)
LYMPHOCYTES RELATIVE PERCENT: 24.6 %
MCH RBC QN AUTO: 29.7 PG (ref 27–31.3)
MCHC RBC AUTO-ENTMCNC: 33.2 % (ref 33–37)
MCV RBC AUTO: 89.3 FL (ref 82–100)
MONOCYTES ABSOLUTE: 0.3 K/UL (ref 0.2–0.8)
MONOCYTES RELATIVE PERCENT: 6.2 %
NEUTROPHILS ABSOLUTE: 3.7 K/UL (ref 1.4–6.5)
NEUTROPHILS RELATIVE PERCENT: 65.7 %
PDW BLD-RTO: 12.6 % (ref 11.5–14.5)
PLATELET # BLD: 305 K/UL (ref 130–400)
POTASSIUM SERPL-SCNC: 4.2 MEQ/L (ref 3.4–4.9)
RBC # BLD: 4.51 M/UL (ref 4.2–5.4)
SODIUM BLD-SCNC: 137 MEQ/L (ref 135–144)
TOTAL PROTEIN: 7.2 G/DL (ref 6.3–8)
TRIGL SERPL-MCNC: 155 MG/DL (ref 0–150)
WBC # BLD: 5.6 K/UL (ref 4.8–10.8)

## 2021-04-05 ENCOUNTER — VIRTUAL VISIT (OUTPATIENT)
Dept: GASTROENTEROLOGY | Age: 36
End: 2021-04-05
Payer: COMMERCIAL

## 2021-04-05 DIAGNOSIS — K21.9 GASTROESOPHAGEAL REFLUX DISEASE WITHOUT ESOPHAGITIS: Primary | ICD-10-CM

## 2021-04-05 PROCEDURE — 99442 PR PHYS/QHP TELEPHONE EVALUATION 11-20 MIN: CPT | Performed by: NURSE PRACTITIONER

## 2021-04-05 ASSESSMENT — ENCOUNTER SYMPTOMS
DIARRHEA: 0
ABDOMINAL PAIN: 0
ABDOMINAL DISTENTION: 0
SHORTNESS OF BREATH: 0
VOICE CHANGE: 0
WHEEZING: 0
NAUSEA: 0
EYE REDNESS: 0
PHOTOPHOBIA: 0
CHEST TIGHTNESS: 0
TROUBLE SWALLOWING: 0
CONSTIPATION: 0
VOMITING: 0
RECTAL PAIN: 0
BLOOD IN STOOL: 0
COLOR CHANGE: 0
EYE PAIN: 0
ANAL BLEEDING: 0

## 2021-04-05 NOTE — PROGRESS NOTES
2021    TELEHEALTH EVALUATION -- Audio/Visual (During PSTHG-46 public health emergency)    Due to COVID 19 outbreak, patient's office visit was converted to a virtual visit. Patient was contacted and agreed to proceed with a virtual visit via [email or web address] me  The risks and benefits of converting to a virtual visit were discussed in light of the current infectious disease epidemic. Patient also understood that insurance coverage and co-pays are up to their individual insurance plans. HPI:    Jeri Briones (:  ) has requested an audio/video evaluation for the following concern(s): Follow-up to recent EGD, EGD was normal, no biopsies were obtained, results discussed at length with the patient. Since her procedure she has been on a PPI daily. She is currently asymptomatic, mentions that she can identify trigger foods that exacerbate her symptoms and she is trying to avoid them. She denies nausea or vomiting, epigastric pain, hematemesis, melena, or hematochezia. Endoscopic Hx:  EGD Dr Mauyri Beaver 3/2/21  Negative / normal EGD  Background  This is a very pleasant 80-year-old who came in today for further evaluation management of GERD and acid. Patient mentioned that over the last few months and and more recently, she noticed burning sensation and rarely food regurgitation that occurs especially after meals. Patient mentioned that she been prescribed Prilosec with partial symptoms improvement. Denies any difficulty swallowing or weight loss. As mentioned rare nocturnal symptoms. No previous EGD reported. Does endorse family history of esophageal cancer. No dysphagia, hematemesis or melena reported. Patient came in today to establish care and further evaluation management  Review of Systems   Constitutional: Negative for appetite change, chills, fever and unexpected weight change. HENT: Negative for nosebleeds, tinnitus, trouble swallowing and voice change.     Eyes: Negative for photophobia, pain and redness. Respiratory: Negative for chest tightness, shortness of breath and wheezing. Cardiovascular: Negative for chest pain, palpitations and leg swelling. Gastrointestinal: Negative for abdominal distention, abdominal pain, anal bleeding, blood in stool, constipation, diarrhea, nausea, rectal pain and vomiting. Endocrine: Negative for polydipsia, polyphagia and polyuria. Genitourinary: Negative for difficulty urinating and hematuria. Skin: Negative for color change, pallor and rash. Neurological: Negative for dizziness, speech difficulty and headaches. Psychiatric/Behavioral: Negative for confusion and suicidal ideas. Prior to Visit Medications    Medication Sig Taking?  Authorizing Provider   omeprazole (PRILOSEC) 20 MG delayed release capsule Take 20 mg by mouth daily Yes Historical Provider, MD   norethindrone-ethinyl estradiol (MICROGESTIN 1/20) 1-20 MG-MCG per tablet TAKE ONE TABLET BY MOUTH DAILY skip placebo Yes Historical Provider, MD   ibuprofen (ADVIL;MOTRIN) 200 MG tablet Take 200 mg by mouth every 6 hours as needed for Pain Yes Historical Provider, MD   fluticasone (FLONASE) 50 MCG/ACT nasal spray 1 spray by Nasal route daily  Patient not taking: Reported on 2/23/2021  JULIA Nava - CNP       Social History     Tobacco Use    Smoking status: Never Smoker    Smokeless tobacco: Never Used   Substance Use Topics    Alcohol use: Yes     Comment: socially    Drug use: Yes     Types: Marijuana     Comment: rarely        Allergies   Allergen Reactions    Codeine    , No past medical history on file.,   Past Surgical History:   Procedure Laterality Date    UPPER GASTROINTESTINAL ENDOSCOPY N/A 3/2/2021    EGD ESOPHAGOGASTRODUODENOSCOPY performed by Judith Duran MD at [address] [place] [address]  1-2013    all 4    ,   Social History     Tobacco Use    Smoking status: Never Smoker    Smokeless tobacco: Never Used   Substance Use Topics    Alcohol use: Yes     Comment: socially    Drug use: Yes     Types: Marijuana     Comment: rarely   ,   Family History   Problem Relation Age of Onset    Cancer Maternal Grandmother     Cancer Maternal Grandfather        PHYSICAL EXAMINATION:  [ INSTRUCTIONS:  \"[x]\" Indicates a positive item  \"[]\" Indicates a negative item  -- DELETE ALL ITEMS NOT EXAMINED]  [x] Alert  [x] Oriented to person/place/time    [x] No apparent distress  [] Toxic appearing    [] Face flushed appearing [] Sclera clear  [] Lips are cyanotic      [x] Breathing appears normal  [] Appears tachypneic      [] Rash on visible skin    [x] Cranial Nerves II-XII grossly intact    [] Motor grossly intact in visible upper extremities    [] Motor grossly intact in visible lower extremities    [x] Normal Mood  [] Anxious appearing    [] Depressed appearing  [] Confused appearing      [] Poor short term memory  [] Poor long term memory    [] OTHER:      Due to this being a TeleHealth encounter, evaluation of the following organ systems is limited: Vitals/Constitutional/EENT/Resp/CV/GI//MS/Neuro/Skin/Heme-Lymph-Imm. ASSESSMENT/PLAN:  1. Heartburn GERD  Recent EGD was negative, no biopsies were obtained, has been on Prilosec as directed is asymptomatic.   = Advised on the correct dose and timing of the PPI, 20 to 30 min before breakfast, will taper off PPI, use H2 blocker, Tums or Rolaids for breakthrough symptoms  = Pathophysiology and etiology of reflux discussed at length  = Lifestyle modification recommended and discussed with the patient   --Avoid spicy and acidic based foods   --Limit coffee, tea, alcohol use   --Limit the amount of food during meal time   --Avoid bedtime snacks and eat meals 3 to 4 hrs before lying down   --Elevate the head of the bed    --Keep healthy weight   2. No significant associated medical conditions      Return if symptoms worsen or fail to improve. An  electronic signature was used to authenticate this note.

## 2021-06-09 ENCOUNTER — OFFICE VISIT (OUTPATIENT)
Dept: FAMILY MEDICINE CLINIC | Age: 36
End: 2021-06-09
Payer: COMMERCIAL

## 2021-06-09 VITALS
HEIGHT: 63 IN | SYSTOLIC BLOOD PRESSURE: 108 MMHG | DIASTOLIC BLOOD PRESSURE: 64 MMHG | HEART RATE: 95 BPM | WEIGHT: 159 LBS | BODY MASS INDEX: 28.17 KG/M2 | OXYGEN SATURATION: 97 %

## 2021-06-09 DIAGNOSIS — L24.7 IRRITANT CONTACT DERMATITIS DUE TO PLANTS, EXCEPT FOOD: Primary | ICD-10-CM

## 2021-06-09 PROCEDURE — 96372 THER/PROPH/DIAG INJ SC/IM: CPT | Performed by: NURSE PRACTITIONER

## 2021-06-09 PROCEDURE — 99213 OFFICE O/P EST LOW 20 MIN: CPT | Performed by: NURSE PRACTITIONER

## 2021-06-09 PROCEDURE — G8428 CUR MEDS NOT DOCUMENT: HCPCS | Performed by: NURSE PRACTITIONER

## 2021-06-09 PROCEDURE — 1036F TOBACCO NON-USER: CPT | Performed by: NURSE PRACTITIONER

## 2021-06-09 PROCEDURE — G8419 CALC BMI OUT NRM PARAM NOF/U: HCPCS | Performed by: NURSE PRACTITIONER

## 2021-06-09 RX ORDER — TRIAMCINOLONE ACETONIDE 40 MG/ML
40 INJECTION, SUSPENSION INTRA-ARTICULAR; INTRAMUSCULAR ONCE
Status: COMPLETED | OUTPATIENT
Start: 2021-06-09 | End: 2021-06-09

## 2021-06-09 RX ORDER — METHYLPREDNISOLONE 4 MG/1
TABLET ORAL
Qty: 21 TABLET | Refills: 0 | Status: SHIPPED | OUTPATIENT
Start: 2021-06-09 | End: 2021-06-15

## 2021-06-09 RX ADMIN — TRIAMCINOLONE ACETONIDE 40 MG: 40 INJECTION, SUSPENSION INTRA-ARTICULAR; INTRAMUSCULAR at 10:26

## 2021-06-09 ASSESSMENT — ENCOUNTER SYMPTOMS
SHORTNESS OF BREATH: 0
COUGH: 0

## 2021-06-09 NOTE — PROGRESS NOTES
Subjective  Chief Complaint   Patient presents with   [facility]     pt states poison ivy located everywhere x 4 days, has been spreading all over. pt states she has used hydrocortizone cream and iverast        Rash  This is a new problem. The current episode started in the past 7 days. The problem has been gradually worsening since onset. The affected locations include the right arm, left lower leg, right lower leg and torso. The rash is characterized by blistering, draining, redness and itchiness. She was exposed to plant contact. Pertinent negatives include no cough, fatigue or shortness of breath. (None) Treatments tried: Ivarest. The treatment provided mild relief. Patient Active Problem List    Diagnosis Date Noted    Gastroesophageal reflux disease without esophagitis      No past medical history on file.   Past Surgical History:   Procedure Laterality Date    UPPER GASTROINTESTINAL ENDOSCOPY N/A 3/2/2021    EGD ESOPHAGOGASTRODUODENOSCOPY performed by Teressa Murillo MD at [address] EXTRACTION  1-2013    all 4      Family History   Problem Relation Age of Onset    Cancer Maternal Grandmother     Cancer Maternal Grandfather      Social History     Socioeconomic History    Marital status:      Spouse name: None    Number of children: None    Years of education: None    Highest education level: None   Occupational History    None   Tobacco Use    Smoking status: Never Smoker    Smokeless tobacco: Never Used   Vaping Use    Vaping Use: Never used   Substance and Sexual Activity    Alcohol use: Yes     Comment: socially    Drug use: Yes     Types: Marijuana     Comment: rarely    Sexual activity: None   Other Topics Concern    None   Social History Narrative    None     Social Determinants of Health     Financial Resource Strain: Low Risk     Difficulty of Paying Living Expenses: Not hard at all   Food Insecurity: No Food Insecurity    Worried About Running Out of Food in the Last Year: Never true    Ran Out of Food in the Last Year: Never true   Transportation Needs: No Transportation Needs    Lack of Transportation (Medical): No    Lack of Transportation (Non-Medical): No   Physical Activity:     Days of Exercise per Week:     Minutes of Exercise per Session:    Stress:     Feeling of Stress :    Social Connections:     Frequency of Communication with Friends and Family:     Frequency of Social Gatherings with Friends and Family:     Attends Muslim Services:     Active Member of Clubs or Organizations:     Attends Club or Organization Meetings:     Marital Status:    Intimate Partner Violence:     Fear of Current or Ex-Partner:     Emotionally Abused:     Physically Abused:     Sexually Abused:      Current Outpatient Medications on File Prior to Visit   Medication Sig Dispense Refill    omeprazole (PRILOSEC) 20 MG delayed release capsule Take 20 mg by mouth daily      norethindrone-ethinyl estradiol (MICROGESTIN 1/20) 1-20 MG-MCG per tablet TAKE ONE TABLET BY MOUTH DAILY skip placebo      ibuprofen (ADVIL;MOTRIN) 200 MG tablet Take 200 mg by mouth every 6 hours as needed for Pain       No current facility-administered medications on file prior to visit. Allergies   Allergen Reactions    Codeine        Review of Systems   Constitutional: Negative for fatigue. Respiratory: Negative for cough and shortness of breath. Cardiovascular: Negative for chest pain. Skin: Positive for rash. Objective  Vitals:    06/09/21 1008   BP: 108/64   Pulse: 95   SpO2: 97%   Weight: 159 lb (72.1 kg)   Height: 5' 3\" (1.6 m)     Physical Exam  Vitals and nursing note reviewed. Constitutional:       Appearance: Normal appearance. HENT:      Head: Normocephalic. Mouth/Throat:      Mouth: Mucous membranes are moist.   Eyes:      Extraocular Movements: Extraocular movements intact.       Conjunctiva/sclera: Conjunctivae normal. Pupils: Pupils are equal, round, and reactive to light. Cardiovascular:      Rate and Rhythm: Normal rate and regular rhythm. Pulses: Normal pulses. Heart sounds: Normal heart sounds. Pulmonary:      Effort: Pulmonary effort is normal.      Breath sounds: Normal breath sounds. Skin:     Findings: Rash present. Neurological:      General: No focal deficit present. Mental Status: She is alert and oriented to person, place, and time. Mental status is at baseline. Psychiatric:         Mood and Affect: Mood normal.         Behavior: Behavior normal.         Thought Content: Thought content normal.         Judgment: Judgment normal.       Assessment & Plan     Diagnosis Orders   1. Irritant contact dermatitis due to plants, except food  triamcinolone acetonide (KENALOG-40) injection 40 mg    methylPREDNISolone (MEDROL DOSEPACK) 4 MG tablet       No orders of the defined types were placed in this encounter. Orders Placed This Encounter   Medications    triamcinolone acetonide (KENALOG-40) injection 40 mg    methylPREDNISolone (MEDROL DOSEPACK) 4 MG tablet     Sig: Take by mouth. Dispense:  21 tablet     Refill:  0     Side effects, adverse effects of the medication prescribed today, as well as treatment plan/ rationale and result expectations have been discussed with the patient who expresses understanding and desires to proceed. Close follow up to evaluate treatment results and for coordination of care. I have reviewed the patient's medical history in detail and updated the computerized patient record. As always, patient is advised that if symptoms worsen in any way they will proceed to the nearest emergency room. She will take the oral steroids if minimal improvement after 72 hrs.    Michele Pereira APRN - [facility]

## (undated) DEVICE — Device: Brand: ENDO SMARTCAP

## (undated) DEVICE — CONMED SCOPE SAVER BITE BLOCK, 20X27 MM: Brand: SCOPE SAVER

## (undated) DEVICE — GLOVE ORANGE PI 8 1/2   MSG9085

## (undated) DEVICE — BRUSH ENDO CLN L90.5IN SHTH DIA1.7MM BRIST DIA5-7MM 2-6MM

## (undated) DEVICE — ENDO CARRY-ON PROCEDURE KIT: Brand: ENDO CARRY-ON PROCEDURE KIT

## (undated) DEVICE — TUBE SET 96 MM 64 MM H2O PERISTALTIC STD AUX CHANNEL

## (undated) DEVICE — SINGLE PORT MANIFOLD: Brand: NEPTUNE 2

## (undated) DEVICE — TUBING, SUCTION, 1/4" X 10', STRAIGHT: Brand: MEDLINE

## (undated) DEVICE — ADAPTER FLSH PMP FLD MGMT GI IRRIG OFP 2 DISPOSABLE